# Patient Record
Sex: MALE | Race: WHITE
[De-identification: names, ages, dates, MRNs, and addresses within clinical notes are randomized per-mention and may not be internally consistent; named-entity substitution may affect disease eponyms.]

---

## 2019-06-21 ENCOUNTER — HOSPITAL ENCOUNTER (EMERGENCY)
Dept: HOSPITAL 75 - ER FS | Age: 24
Discharge: HOME | End: 2019-06-21
Payer: SELF-PAY

## 2019-06-21 VITALS — DIASTOLIC BLOOD PRESSURE: 66 MMHG | SYSTOLIC BLOOD PRESSURE: 122 MMHG

## 2019-06-21 VITALS — WEIGHT: 135 LBS | HEIGHT: 64 IN | BODY MASS INDEX: 23.05 KG/M2
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
## 2019-06-21 DIAGNOSIS — F12.10: ICD-10-CM

## 2019-06-21 DIAGNOSIS — Z90.89: ICD-10-CM

## 2019-06-21 DIAGNOSIS — F17.210: ICD-10-CM

## 2019-06-21 DIAGNOSIS — S40.011A: Primary | ICD-10-CM

## 2019-06-21 PROCEDURE — 73030 X-RAY EXAM OF SHOULDER: CPT

## 2019-06-21 NOTE — XMS REPORT
[facility]

                             Created on: 2016



ChelyPrema buncher

External Reference #: [id number]

: 1995

Sex: Male



Demographics







                          Address                   [address]  [address]

 

                          Home Phone                [phone]

 

                          Preferred Language        Unknown

 

                          Marital Status            Unknown

 

                          Pentecostal Affiliation     Unknown

 

                          Race                      White

 

                          Ethnic Group              Refused to Report





Author







                          XI Quintana

 

                          Organization              [employer]

 

                          Address                   Unknown

 

                          Phone                     Unavailable







Care Team Providers







                    Care Team Member Name    Role                Phone

 

                    XI HERNANDEZ     CP                  Unavailable



                                                                



Allergies, Adverse Reactions, Alerts

          





                    Substance           Reaction            Event Type

 

                    N.K.D.A.            Info Not Available    Non Drug Allergy



                                                                                
       



Problems

          





             Problem Type    Condition    Code         Onset Dates    Condition Status

 

             Assessment    Acute pain of right shoulder    M25.511                   Active



                                                                                
       



Medications

          





        Medication    Code System    Code    Instructions    Start Date    End Date    Status    Dosage



 

        Tylenol 8 Hour    NDC     [address]-80    650 MG Orally every 8 hrs                            2 tablets

 as needed

 

        Ibuprofen    NDC     [address]-05    200 MG Orally every 6 hrs                            2 tablet as needed





                                                                                
                 



Procedures

          





                Procedure       Coding System    Code            Date

 

                Office Visit, Est Pt., Level 3    CPT-4           35128           2016



                                                                                
                 



Vital Signs

          





                          Date/Time:                2016

 

                          Cardiac Monitoring Heart Rate    92 bpm

 

                          Weight                    145.6 lbs

 

                          Height                    5'5" in

 

                          BMIPercentile             71.66 %

 

                          Wt Percentile             40.14 %

 

                          Blood Pressure Diastolic    76 mmHg

 

                          Blood Pressure Systolic    124 mmHg



                                                                              



Results

          No Known Results                                                      
             



Summary Purpose

          [employer] Submission

## 2019-06-21 NOTE — DIAGNOSTIC IMAGING REPORT
INDICATION: Right shoulder pain after a fall.



EXAMINATION: Right shoulder dated 06/21/2019.



Three views of the right shoulder.



FINDINGS: There is no evidence for an acute fracture or

dislocation. The joint spaces are well maintained. There is no

significant soft tissue swelling.



IMPRESSION: No acute process.



Dictated by: 



  Dictated on workstation # [id number]
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
